# Patient Record
Sex: MALE | Race: WHITE | URBAN - METROPOLITAN AREA
[De-identification: names, ages, dates, MRNs, and addresses within clinical notes are randomized per-mention and may not be internally consistent; named-entity substitution may affect disease eponyms.]

---

## 2018-09-24 ENCOUNTER — APPOINTMENT (RX ONLY)
Dept: URBAN - METROPOLITAN AREA OTHER 12 | Facility: OTHER | Age: 68
Setting detail: DERMATOLOGY
End: 2018-09-24

## 2018-09-24 DIAGNOSIS — L30.4 ERYTHEMA INTERTRIGO: ICD-10-CM

## 2018-09-24 DIAGNOSIS — D22 MELANOCYTIC NEVI: ICD-10-CM

## 2018-09-24 DIAGNOSIS — Q83.3 ACCESSORY NIPPLE: ICD-10-CM

## 2018-09-24 DIAGNOSIS — Z85.820 PERSONAL HISTORY OF MALIGNANT MELANOMA OF SKIN: ICD-10-CM

## 2018-09-24 PROBLEM — D22.5 MELANOCYTIC NEVI OF TRUNK: Status: ACTIVE | Noted: 2018-09-24

## 2018-09-24 PROCEDURE — ? COUNSELING

## 2018-09-24 PROCEDURE — 99203 OFFICE O/P NEW LOW 30 MIN: CPT

## 2018-09-24 PROCEDURE — ? PRESCRIPTION

## 2018-09-24 RX ORDER — NYSTATIN 100000 [USP'U]/G
POWDER TOPICAL
Qty: 2 | Refills: 6 | COMMUNITY
Start: 2018-09-24

## 2018-09-24 RX ADMIN — NYSTATIN: 100000 POWDER TOPICAL at 21:48

## 2018-09-24 ASSESSMENT — LOCATION ZONE DERM
LOCATION ZONE: TRUNK
LOCATION ZONE: GENITALIA
LOCATION ZONE: PENIS

## 2018-09-24 ASSESSMENT — LOCATION SIMPLE DESCRIPTION DERM
LOCATION SIMPLE: GROIN
LOCATION SIMPLE: PENIS
LOCATION SIMPLE: SCROTUM
LOCATION SIMPLE: ABDOMEN
LOCATION SIMPLE: LEFT UPPER BACK

## 2018-09-24 ASSESSMENT — LOCATION DETAILED DESCRIPTION DERM
LOCATION DETAILED: LEFT ANTERIOR SCROTUM
LOCATION DETAILED: LEFT RIB CAGE
LOCATION DETAILED: RIGHT INGUINAL CREASE
LOCATION DETAILED: DORSAL GLANS
LOCATION DETAILED: LEFT MID-UPPER BACK
LOCATION DETAILED: LEFT INGUINAL CREASE

## 2018-09-24 NOTE — HPI: MELANOMA F/U (HISTORY OF MALIGNANT MELANOMA)
What Is The Reason For Today's Visit?: History of Melanoma
Additional History: Positive lymph node was on left axillae.
Year Excised?: 2004

## 2021-04-28 ENCOUNTER — APPOINTMENT (RX ONLY)
Dept: URBAN - METROPOLITAN AREA OTHER 11 | Facility: OTHER | Age: 71
Setting detail: DERMATOLOGY
End: 2021-04-28